# Patient Record
Sex: FEMALE | Race: WHITE
[De-identification: names, ages, dates, MRNs, and addresses within clinical notes are randomized per-mention and may not be internally consistent; named-entity substitution may affect disease eponyms.]

---

## 2021-07-01 ENCOUNTER — HOSPITAL ENCOUNTER (EMERGENCY)
Dept: HOSPITAL 97 - ER | Age: 31
Discharge: HOME | End: 2021-07-01
Payer: COMMERCIAL

## 2021-07-01 VITALS — OXYGEN SATURATION: 100 %

## 2021-07-01 VITALS — SYSTOLIC BLOOD PRESSURE: 120 MMHG | DIASTOLIC BLOOD PRESSURE: 84 MMHG

## 2021-07-01 VITALS — TEMPERATURE: 97.4 F

## 2021-07-01 DIAGNOSIS — N10: Primary | ICD-10-CM

## 2021-07-01 LAB
ALBUMIN SERPL BCP-MCNC: 3.2 G/DL (ref 3.4–5)
ALP SERPL-CCNC: 108 U/L (ref 45–117)
ALT SERPL W P-5'-P-CCNC: 37 U/L (ref 12–78)
AST SERPL W P-5'-P-CCNC: 26 U/L (ref 15–37)
BUN BLD-MCNC: 11 MG/DL (ref 7–18)
GLUCOSE SERPLBLD-MCNC: 142 MG/DL (ref 74–106)
HCT VFR BLD CALC: 33.1 % (ref 36–45)
LIPASE SERPL-CCNC: 73 U/L (ref 73–393)
LYMPHOCYTES # SPEC AUTO: 1 K/UL (ref 0.7–4.9)
PMV BLD: 7.2 FL (ref 7.6–11.3)
POTASSIUM SERPL-SCNC: 3 MMOL/L (ref 3.5–5.1)
RBC # BLD: 4.09 M/UL (ref 3.86–4.86)

## 2021-07-01 PROCEDURE — 80048 BASIC METABOLIC PNL TOTAL CA: CPT

## 2021-07-01 PROCEDURE — 81015 MICROSCOPIC EXAM OF URINE: CPT

## 2021-07-01 PROCEDURE — 99284 EMERGENCY DEPT VISIT MOD MDM: CPT

## 2021-07-01 PROCEDURE — 80076 HEPATIC FUNCTION PANEL: CPT

## 2021-07-01 PROCEDURE — 36415 COLL VENOUS BLD VENIPUNCTURE: CPT

## 2021-07-01 PROCEDURE — 87088 URINE BACTERIA CULTURE: CPT

## 2021-07-01 PROCEDURE — 96361 HYDRATE IV INFUSION ADD-ON: CPT

## 2021-07-01 PROCEDURE — 74177 CT ABD & PELVIS W/CONTRAST: CPT

## 2021-07-01 PROCEDURE — 87086 URINE CULTURE/COLONY COUNT: CPT

## 2021-07-01 PROCEDURE — 81003 URINALYSIS AUTO W/O SCOPE: CPT

## 2021-07-01 PROCEDURE — 96374 THER/PROPH/DIAG INJ IV PUSH: CPT

## 2021-07-01 PROCEDURE — 85025 COMPLETE CBC W/AUTO DIFF WBC: CPT

## 2021-07-01 PROCEDURE — 83690 ASSAY OF LIPASE: CPT

## 2021-07-01 NOTE — EDPHYS
Physician Documentation                                                                           

 [facility]                                                                 

Name: Stephanie Manley                                                                                 

Age: 31 yrs                                                                                       

Sex: Female                                                                                       

: 1990                                                                                   

MRN: [id number]                                                                                   

Arrival Date: 2021                                                                          

Time: 13:00                                                                                       

Account#: [id number]                                                                            

Bed 26                                                                                            

Private MD:                                                                                       

ED Physician Saul Pineda                                                                      

HPI:                                                                                              

                                                                                             

14:49 This 31 yrs old  Female presents to ER via Ambulatory with complaints of Flank jmm 

      Pain.                                                                                       

14:49 Onset: The symptoms/episode began/occurred gradually, 5 day(s) ago. Modifying factors:  jmm 

      The symptoms are alleviated by nothing. the symptoms are aggravated by nothing.             

      Associated signs and symptoms: Pertinent positives: decreased appetite. The patient has     

      not experienced similar symptoms in the past. This is a 31 year old female with no          

      chronic medical conditions that presents to the ED with complaints of right sided back      

      pain which radiates from the low back top the mid back. Denies vomiting, diarrhea but       

      states having a decreased appetite. Denies abdominal pain. .                                

                                                                                                  

OB/GYN:                                                                                           

13:17 LMP 6/15/2021                                                                           ph  

                                                                                                  

Historical:                                                                                       

- Allergies:                                                                                      

13:16 No Known Allergies;                                                                     ph  

- PMHx:                                                                                           

13:16 None;                                                                                   ph  

                                                                                                  

- Immunization history:: Client reports receiving the 2nd dose of the Covid vaccine.              

- Social history:: Smoking status: Patient denies any tobacco usage or history of.                

                                                                                                  

                                                                                                  

ROS:                                                                                              

14:49 Constitutional: Negative for fever, chills, and weight loss, Cardiovascular: Negative   jmm 

      for chest pain, palpitations, and edema, Respiratory: Negative for shortness of breath,     

      cough, wheezing, and pleuritic chest pain.                                                  

14:49 Back: Positive for pain at rest.                                                            

14:49 All other systems are negative.                                                             

                                                                                                  

Exam:                                                                                             

14:49 Constitutional:  This is a well developed, well nourished patient who is awake, alert,  jmm 

      and in no acute distress. Head/Face:  atraumatic. Eyes:  EOMI, no conjunctival erythema     

      appreciated ENT:  Moist Mucus Membranes Neck:  Trachea midline, Supple Chest/axilla:        

      Normal chest wall appearance and motion.   Cardiovascular:  Regular rate and rhythm.        

      No edema appreciated Respiratory:  Normal respirations, no respiratory distress             

      appreciated Abdomen/GI:  Non distended, soft                                                

14:49 Skin:  General appearance color normal MS/ Extremity:  Moves all extremities, no            

      obvious deformities appreciated, no edema noted to the lower extremities  Neuro:  Awake     

      and alert, normal gait Psych:  Behavior is normal, Mood is normal, Patient is               

      cooperative and pleasant                                                                    

14:49 Back: CVA tenderness, that is mild, is noted on the right.                                  

                                                                                                  

Vital Signs:                                                                                      

13:14  / 91; Pulse 119; Resp 18; Temp 97.4; Pulse Ox 99% on R/A; Weight 64.41 kg;       ph  

      Height 5 ft. 3 in. (160.02 cm);                                                             

13:48  / 100; Pulse 104; Resp 18; Pulse Ox 100% on R/A; Pain 8/10;                      ld1 

15:22  / 87; Pulse 89; Resp 18; Pulse Ox 100% ;                                         ld1 

16:50  / 84; Pulse 88; Resp 18; Pulse Ox 100% ;                                         ld1 

13:14 Body Mass Index 25.15 (64.41 kg, 160.02 cm)                                             ph  

                                                                                                  

MDM:                                                                                              

13:13 Patient medically screened.                                                             ba 

17:29 Data reviewed: vital signs, nurses notes. Counseling: I had a detailed discussion with  sahara 

      the patient and/or guardian regarding: the historical points, exam findings, and any        

      diagnostic results supporting the discharge/admit diagnosis, lab results, radiology         

      results, the need for outpatient follow up, to return to the emergency department if        

      symptoms worsen or persist or if there are any questions or concerns that arise at          

      home. ED course: Patient is alert and non toxic in appearance in the ED. No signs of        

      sepsis. Patient is advised to follow up with pcp for further evaluation. Patient            

      understood and agrees with the plan of care. .                                              

17:40 ED course:  aware negative for search results.                                       [facility] 

                                                                                                  

                                                                                             

13:39 Order name: Basic Metabolic Panel; Complete Time: 14:25                                 [facility] 

                                                                                             

13:39 Order name: CBC with Diff; Complete Time: 13:59                                         [facility] 

                                                                                             

13:39 Order name: Hepatic Function; Complete Time: 14:25                                      [facility] 

                                                                                             

13:39 Order name: Lipase; Complete Time: 14:25                                                [facility] 

                                                                                             

14:10 Order name: Urine Microscopic Only                                                      [facility] 

                                                                                             

14:10 Order name: Urine Culture                                                               [facility] 

                                                                                             

13:39 Order name: IV Saline Lock; Complete Time: 13:48                                        [facility] 

                                                                                             

13:39 Order name: Labs collected and sent; Complete Time: 13:48                               [facility] 

                                                                                             

13:39 Order name: Urine Dipstick-Ancillary (obtain specimen); Complete Time: 14:10            [facility] 

                                                                                             

14:10 Order name: Urine Dipstick-Ancillary; Complete Time: 14:25                              [facility]

                                                                                             

14:10 Order name: Urine Microscopic Only; Complete Time: 14:49                                [facility]

                                                                                             

15:32 Order name: CT Abd/Pelvis - IV Contrast Only; Complete Time: 16:40                      [facility] 

                                                                                             

13:39 Order name: Urine Pregnancy Test (obtain specimen); Complete Time: 14:10                [facility] 

                                                                                                  

Administered Medications:                                                                         

14:15 Drug: NS 0.9% 1000 ml Route: IV; Rate: 1 bolus; Site: right antecubital;                ld1 

16:52 Follow up: Response: No adverse reaction; IV Status: Completed infusion                 ld1 

16:52 Drug: Rocephin (cefTRIAXone) 2 grams Route: IV; Rate: calculated rate; Site: right      ld1 

      antecubital;                                                                                

                                                                                                  

                                                                                                  

Disposition:                                                                                      

                                                                                             

07:33 Co-signature as Attending Physician, Saul Pineda MD I agree with the assessment and  ba 

      plan of care.                                                                               

                                                                                                  

Disposition Summary:                                                                              

21 17:32                                                                                    

Discharge Ordered                                                                                 

      Location: Home                                                                          [facility] 

      Condition: Stable                                                                       [facility] 

      Diagnosis                                                                                   

        - Pyelonephritis acute                                                                [facility] 

      Followup:                                                                               [facility] 

        - With: Private Physician                                                                  

        - When: 2 - 3 days                                                                         

        - Reason: Recheck today's complaints, Continuance of care, Re-evaluation by your           

      physician                                                                                   

      Discharge Instructions:                                                                     

        - Discharge Summary Sheet                                                             [facility] 

        - Pyelonephritis, Adult                                                               [facility] 

      Forms:                                                                                      

        - Medication Reconciliation Form                                                      [facility] 

        - Thank You Letter                                                                    [facility] 

        - Antibiotic Education                                                                [facility] 

        - Prescription Opioid Use                                                             [facility] 

      Prescriptions:                                                                              

        - Tramadol 50 mg Oral Tablet                                                               

            - take 1 tablet by ORAL route every 8 hours as needed; 12 tablet; Refills: 0,     [facility] 

      Product Selection Permitted                                                                 

        - cefpodoxime 200 mg Oral Tablet                                                           

            - take 1 tablet by ORAL route every 12 hours for 10 days with food; 20 tablet;    [facility] 

      Refills: 0, Product Selection Permitted                                                     

Signatures:                                                                                       

Dispatcher [employer]                           Saul Syed MD MD cha Mickail, Joel, PA PA   Rachel Cotter RN                      RN                                                      

Gwen Wright RN                     RN   ld1                                                  

                                                                                                  

**************************************************************************************************

## 2021-07-01 NOTE — ER
Nurse's Notes                                                                                     

 [facility]                                                                 

Name: Stephanie Manley                                                                                 

Age: 31 yrs                                                                                       

Sex: Female                                                                                       

: 1990                                                                                   

MRN: [id number]                                                                                   

Arrival Date: 2021                                                                          

Time: 13:00                                                                                       

Account#: [id number]                                                                            

Bed 26                                                                                            

Private MD:                                                                                       

Diagnosis: Pyelonephritis acute                                                                   

                                                                                                  

Presentation:                                                                                     

                                                                                             

13:14 Chief complaint: Patient states: R low back pain since last week, has been seen at        

      urgent care and PCP but has not found cause. Also c/o chills, fatigue, N/V, denies          

      urinary symptoms. Coronavirus screen: Client denies travel out of the [place] in the last      

      14 days. At this time, the client does not indicate any symptoms associated with            

      coronavirus-19. Ebola Screen: No symptoms or risks identified at this time. Initial         

      Sepsis Screen: Does the patient meet any 2 criteria? No. Patient's initial sepsis           

      screen is negative. Does the patient have a suspected source of infection? No.              

      Patient's initial sepsis screen is negative. Risk Assessment: Do you want to hurt           

      yourself or someone else? Patient reports no desire to harm self or others. Onset of        

      symptoms was 2021.                                                                 

13:14 Method Of Arrival: Ambulatory                                                             

13:14 Acuity: ADELITA 3                                                                             

                                                                                                  

OB/GYN:                                                                                           

13:17 LMP 6/15/2021                                                                             

                                                                                                  

Historical:                                                                                       

- Allergies:                                                                                      

13:16 No Known Allergies;                                                                     ph  

- PMHx:                                                                                           

13:16 None;                                                                                   ph  

                                                                                                  

- Immunization history:: Client reports receiving the 2nd dose of the Covid vaccine.              

- Social history:: Smoking status: Patient denies any tobacco usage or history of.                

                                                                                                  

                                                                                                  

Screenin:48 Abuse screen: Denies threats or abuse. Denies injuries from another. Nutritional        ld1 

      screening: No deficits noted. Tuberculosis screening: No symptoms or risk factors           

      identified. Fall Risk None identified.                                                      

                                                                                                  

Assessment:                                                                                       

13:48 General: Appears in no apparent distress. comfortable, Behavior is calm, cooperative,   ld1 

      appropriate for age. Pain: Complains of pain in right low back Pain does not radiate.       

      Pain currently is 8 out of 10 on a pain scale. Quality of pain is described as              

      throbbing, Pain began 2-3 days ago. Is continuous. Neuro: Level of Consciousness is         

      awake, alert, obeys commands, Oriented to person, place, time, situation.                   

      Cardiovascular: Capillary refill < 3 seconds Patient's skin is warm and dry.                

      Respiratory: Airway is patent Respiratory effort is even, unlabored, Respiratory            

      pattern is regular, symmetrical. GI: Abdomen is flat, non-distended. : Urine is           

      clear. EENT: No signs and/or symptoms were reported regarding the EENT system. Derm: No     

      signs and/or symptoms reported regarding the dermatologic system. Musculoskeletal: No       

      signs and/or symptoms reported regarding the musculoskeletal system.                        

15:22 Reassessment: Patient appears in no apparent distress at this time. Patient is alert,   ld1 

      oriented x 3, equal unlabored respirations, skin warm/dry/pink.                             

16:50 Reassessment: Patient appears in no apparent distress at this time. Patient is alert,   ld1 

      oriented x 3, equal unlabored respirations, skin warm/dry/pink.                             

                                                                                                  

Vital Signs:                                                                                      

13:14  / 91; Pulse 119; Resp 18; Temp 97.4; Pulse Ox 99% on R/A; Weight 64.41 kg;       ph  

      Height 5 ft. 3 in. (160.02 cm);                                                             

13:48  / 100; Pulse 104; Resp 18; Pulse Ox 100% on R/A; Pain 8/10;                      ld1 

15:22  / 87; Pulse 89; Resp 18; Pulse Ox 100% ;                                         ld1 

16:50  / 84; Pulse 88; Resp 18; Pulse Ox 100% ;                                         ld1 

13:14 Body Mass Index 25.15 (64.41 kg, 160.02 cm)                                             ph  

                                                                                                  

ED Course:                                                                                        

13:00 Patient arrived in ED.                                                                  as  

13:12 Panda Reis PA is PHCP.                                                              [facility] 

13:12 Saul Pineda MD is Attending Physician.                                             [facility] 

13:16 Triage completed.                                                                       ph  

13:17 Arm band placed on Patient placed in an exam room.                                      ph  

13:25 Gwen Wright RN is Primary Nurse.                                                   ld1 

13:30 Panda Reis PA is PHCP.                                                              jmm 

13:48 Patient has correct armband on for positive identification. Placed in gown. Bed in low  ld1 

      position. Call light in reach. Side rails up X2. Pulse ox on. NIBP on.                      

13:48 No provider procedures requiring assistance completed. Inserted saline lock: 20 gauge   ld1 

      in right antecubital area, using aseptic technique. Blood collected.                        

14:11 Urine Microscopic Only Sent.                                                            ld1 

14:11 Urine Microscopic Only Sent.                                                            ld1 

16:09 CT Abd/Pelvis - IV Contrast Only In Process Unspecified.                                EDMS

17:49 IV discontinued, intact, bleeding controlled, No redness/swelling at site.              ld1 

                                                                                                  

Administered Medications:                                                                         

14:15 Drug: NS 0.9% 1000 ml Route: IV; Rate: 1 bolus; Site: right antecubital;                ld1 

16:52 Follow up: Response: No adverse reaction; IV Status: Completed infusion                 ld1 

16:52 Drug: Rocephin (cefTRIAXone) 2 grams Route: IV; Rate: calculated rate; Site: right      ld1 

      antecubital;                                                                                

                                                                                                  

                                                                                                  

Outcome:                                                                                          

17:32 Discharge ordered by MD.                                                                [facility] 

17:48 Discharged to home ambulatory.                                                          ld1 

17:48 Condition: stable                                                                           

17:48 Discharge instructions given to patient, Instructed on discharge instructions, follow       

      up and referral plans. medication usage, Demonstrated understanding of instructions,        

      follow-up care, medications.                                                                

17:49 Patient left the ED.                                                                    ld1 

                                                                                                  

Signatures:                                                                                       

Dispatcher [employer]                           EDMS                                                 

Panda Reis PA PA jmm Martinez, Amelia                             as                                                   

Rachel Del Rosario, RN                      RN                                                      

Gwen Wright, MICHELE                     RN   ld1                                                  

                                                                                                  

**************************************************************************************************

## 2021-07-01 NOTE — XMS REPORT
Continuity of Care Document

                             Created on:2021



Patient:LETITIA RIVERA

Sex:Female

:1990

External Reference #:[id number]





Demographics







                          Address                   [address] [place] [address]

 

                          Home Phone                [phone]

 

                          Work Phone                [phone]

 

                          Mobile Phone              [phone]

 

                          Email Address             [email or web address]

 

                          Preferred Language        en

 

                          Marital Status            Unknown

 

                          Jewish Affiliation     Unknown

 

                          Race                      Unknown

 

                          Additional Race(s)        Unavailable



                                                    White

 

                          Ethnic Group              Unknown









Author







                          Organization              [facility]

t

 

                          Address                   [address]



                                                    [place] [address]

 

                          Phone                     [phone]









Support







                Name            Relationship    Address         Phone

 

                Edwin        Spouse          [address] [phone]



                                                [place] [address] 

 

                Rafiq         Mother          Unavailable     [phone]

 

                Edwin        Spouse          [address] [phone]



                                                [place] [address] 









Care Team Providers







                    Name                Role                Phone

 

                    Asked,  Pcp         Primary Care Physician Unavailable

 

                    Provider,  Urgent Care Attending Clinician Unavailable

 

                    Ian CEE,  E      Attending Clinician [phone]









Problems

This patient has no known problems.



Allergies, Adverse Reactions, Alerts

This patient has no known allergies or adverse reactions.



Family History







           Family Member Diagnosis  Comments   Start Date Stop Date  Source

 

           Natural mother Cancer                                      [facility]







Social History







           Social Habit Start Date Stop Date  Quantity   Comments   Source

 

           Alcohol intake 2017 Current drinker            [place]

on [facility]



                      00:00:00   00:00:00   of alcohol            



                                            (finding)             

 

           Sex Assigned At 1990                       [facility]

ethod[facility]



           Birth      00:00:00   00:00:00                         









                Smoking Status  Start Date      Stop Date       Source

 

                Never smoker                                    [facility]

t







Medications

This patient has no known medications.



Procedures

This patient has no known procedures.



Plan of Care







             Planned Activity Planned Date Details      Comments     Source

 

             Future Scheduled 2021   INFLUENZA VACCINE              [facility]ina

n [facility]



             Test         00:00:00     [code = INFLUENZA              



                                       VACCINE]                  

 

             Future Scheduled 2009   Screening for              [facility]

[facility]odist



             Test         00:00:00     malignant neoplasm              



                                       of cervix                 



                                       (procedure) [code =              



                                       840154700]                

 

             Future Scheduled 2000   COVID-19 VACCINE (1)              [facility]

[facility]



             Test         00:00:00     [code = COVID-19              



                                       VACCINE (1)]              







Encounters







        Start   End     Encounter Admission Attending Care    Care    Encounter 

Source



        Date/Time Date/Time Type    Type    Clinicians Facility Department ID   

   

 

        2021 Urgent          [facility]    [phone] [id number]

[id number] 



        13:34:57 15:56:38 [facility]  [id number]        

 



                                        Care    [place] [id number]         



                                                [facility] [phone]         



                                                nal     [address]             



                                                Office                  



                                                Building                 



                                                One                     

 

        2021 Telephone         [facility]    [phone]

[id number] 



        00:00:00 00:00:00                 [facility]  [id number]         



                                                [place] [id number]         



                                                [facility] [phone]         



                                                nal     044             



                                                Office                  



                                                Building                 



                                                One                     







Results

This patient has no known results.

## 2021-07-01 NOTE — RAD REPORT
EXAM DESCRIPTION:  CT - Abdomen   Pelvis W Contrast - 7/1/2021 4:09 pm

 

CLINICAL HISTORY:  ABD PAIN

 

COMPARISON:  No comparisons

 

TECHNIQUE:  Biphasic, helical CT imaging of the abdomen and pelvis was performed following 100 ml non
-ionic IV contrast.

 

No oral contrast given.

 

All CT scans are performed using dose optimization technique as appropriate and may include automated
 exposure control or mA/KV adjustment according to patient size.

 

FINDINGS:  No suspicious findings in the lung bases.

 

The liver, spleen, and pancreas show no suspicious findings. Gallbladder and biliary tree are also wi
thout suspicious finding.

 

Moderate severity diminished attenuation involves a substantial portion of the right kidney. No necro
tic or abscessed component. Only minimal stranding is seen in the perinephric fat. No hydronephrosis 
of either kidney. No obstructing or nonobstructing calculi. No enhancement abnormality of the left ki
dney. No solid mass lesions seen. No bladder abnormalities. No adrenal abnormalities.

 

No uterine or right ovary abnormality identified. Left ovary contains a 2.7 centimeter cyst along wit
h additional small follicles. Physiologic quantity of free fluid is present in the cul de sac.

 

No dilated bowel loops or bowel wall thickening.  No free air, free fluid or inflammatory stranding. 
 No mass or bulky lymphadenopathy. There is a normal variant laxity of the anterior abdominal wall wi
th a anterior convex bowing that extends to the skin surface at the umbilicus.

 

No suspicious bony findings.

 

 

IMPRESSION:  Moderate severity right-sided pyelonephritis and right ureteritis. No abscess or emergen
t complication.